# Patient Record
Sex: MALE | Race: WHITE
[De-identification: names, ages, dates, MRNs, and addresses within clinical notes are randomized per-mention and may not be internally consistent; named-entity substitution may affect disease eponyms.]

---

## 2019-06-11 ENCOUNTER — HOSPITAL ENCOUNTER (EMERGENCY)
Dept: HOSPITAL 59 - ER | Age: 54
Discharge: HOME | End: 2019-06-11
Payer: MEDICAID

## 2019-06-11 DIAGNOSIS — F17.210: ICD-10-CM

## 2019-06-11 DIAGNOSIS — J20.9: Primary | ICD-10-CM

## 2019-06-11 LAB
ABSOLUTE NEUTROPHIL COUNT: 5.71
ALBUMIN SERPL-MCNC: 4.2 G/DL (ref 4–5)
ALBUMIN/GLOB SERPL: 1.1 {RATIO} (ref 1.1–1.8)
ALP SERPL-CCNC: 118 U/L (ref 40–129)
ALT SERPL-CCNC: 20 U/L (ref ?–41)
ANION GAP SERPL CALC-SCNC: 12 MMOL/L (ref 7–16)
AST SERPL-CCNC: 17 U/L (ref 10–50)
BASOPHILS NFR BLD: 0.4 % (ref 0–6)
BILIRUB SERPL-MCNC: 0.7 MG/DL (ref 0.2–1)
BUN SERPL-MCNC: 19 MG/DL (ref 6–20)
CO2 SERPL-SCNC: 24 MMOL/L (ref 22–29)
CREAT SERPL-MCNC: 1.1 MG/DL (ref 0.7–1.2)
EOSINOPHIL NFR BLD: 9.1 % (ref 0–6)
ERYTHROCYTE [DISTWIDTH] IN BLOOD BY AUTOMATED COUNT: 13.9 % (ref 11.5–14.5)
EST GLOMERULAR FILTRATION RATE: > 60 ML/MIN
GLOBULIN SER-MCNC: 4 GM/DL (ref 1.4–4.8)
GLUCOSE SERPL-MCNC: 137 MG/DL (ref 74–109)
GRANULOCYTES NFR BLD: 51.4 % (ref 47–80)
HCT VFR BLD CALC: 42.3 % (ref 42–52)
HGB BLD-MCNC: 14.7 GM/DL (ref 14–18)
LYMPHOCYTES NFR BLD AUTO: 32.3 % (ref 16–45)
MCH RBC QN AUTO: 30.4 PG (ref 27–33)
MCHC RBC AUTO-ENTMCNC: 34.8 G/DL (ref 32–36)
MCV RBC AUTO: 87.6 FL (ref 81–97)
MONOCYTES NFR BLD: 6.8 % (ref 0–9)
PLATELET # BLD: 200 K/UL (ref 130–400)
PMV BLD AUTO: 9.7 FL (ref 7.4–10.4)
PROT SERPL-MCNC: 8.2 G/DL (ref 6.6–8.7)
RBC # BLD AUTO: 4.83 M/UL (ref 4.4–5.7)
TSH SERPL-ACNC: 2.21 UIU/ML (ref 0.27–4.2)
WBC # BLD AUTO: 11.1 K/UL (ref 4.2–12.2)

## 2019-06-11 PROCEDURE — 93005 ELECTROCARDIOGRAM TRACING: CPT

## 2019-06-11 PROCEDURE — 84443 ASSAY THYROID STIM HORMONE: CPT

## 2019-06-11 PROCEDURE — 99283 EMERGENCY DEPT VISIT LOW MDM: CPT

## 2019-06-11 PROCEDURE — 85025 COMPLETE CBC W/AUTO DIFF WBC: CPT

## 2019-06-11 PROCEDURE — 80053 COMPREHEN METABOLIC PANEL: CPT

## 2019-06-11 PROCEDURE — 84484 ASSAY OF TROPONIN QUANT: CPT

## 2019-06-11 PROCEDURE — 93010 ELECTROCARDIOGRAM REPORT: CPT

## 2019-06-11 PROCEDURE — 71046 X-RAY EXAM CHEST 2 VIEWS: CPT

## 2019-06-11 NOTE — EMERGENCY DEPARTMENT RECORD
History of Present Illness





- General


Chief Complaint: Cough


Stated Complaint: COUGH,HEART PALP, SORE THROAT


Time Seen by Provider: 19 17:26


Source: Patient


Mode of Arrival: Ambulatory


Limitations: No limitations





- History of Present Illness


Initial Comments: 


The patient is here due to a one week hx of an intermittent cough with nasal 

congestion and a ST. He has had intermittent sputum production but denies any 

SOB, GAURAV, or fever. Additionally the patient did have a feeling that his heart 

was racing for an hour or two last evening. He had no CP, SOB, sweating or 

nausea with it and also no feeling of palpitations or racing today. 





MD Complaint: Cough, Nasal congestion, Sore throat


Onset/Timin


-: Week(s)





- Related Data


                                  Previous Rx's











 Medication  Instructions  Recorded


 


Lisinopril [Zestril] 20 mg PO DAILY #30 tab 16


 


Doxycycline Monohydrate [Mondoxyne 100 mg PO BID 7 Days #14 capsule 19





Nl]  











                                    Allergies











Allergy/AdvReac Type Severity Reaction Status Date / Time


 


No Known Drug Allergies Allergy   Verified 19 17:25














Travel Screening





- Travel/Exposure Within Last 30 Days


Have you traveled within the last 30 days?: No





Review of Systems


Constitutional: Reports: Malaise.  Denies: Chills, Fever


Eyes: Denies: Eye discharge


ENT: Reports: Congestion


Respiratory: Reports: Cough.  Denies: Dyspnea


Cardiovascular: Reports: Arrhythmia, Palpitations.  Denies: Chest pain


Endocrine: Reports: Fatigue


Gastrointestinal: Denies: Abdominal pain, Nausea


Genitourinary: Denies: Dysuria


Musculoskeletal: Denies: Arthralgia, Back pain


Skin: Denies: Bruising





Past Medical History





- SOCIAL HISTORY


Smoking Status: Current every day smoker


Alcohol Use: None


Drug Use: Occasional


Drug Use Detail:: Marijuana





- RESPIRATORY


Hx Respiratory Disorders: No





- CARDIOVASCULAR


Hx Cardio Disorders: Yes


Hx Hypertension: Yes





- NEURO


Hx Neuro Disorders: No





- GI


Hx GI Disorders: No





- 


Hx Genitourinary Disorders: No





- ENDOCRINE


Hx Endocrine Disorders: No





- MUSCULOSKELETAL


Hx Musculoskeletal Disorders: Yes


Hx Arthritis: Yes





- PSYCH


Hx Psych Problems: No





- HEMATOLOGY/ONCOLOGY


Hx Hematology/Oncology Disorders: No





Family Medical History


Any Significant Family History?: Yes


Hx Cancer: Father


Hx Heart Disease: Mother, Brother/Sister





Physical Exam





- General


General Appearance: Alert, Oriented x3, Cooperative, No acute distress





- Head


Head exam: Atraumatic, Normocephalic, Normal inspection





- Eye


Eye exam: Normal appearance, PERRL, EOMI





- ENT


Throat exam: Normal inspection.  negative: Tonsillar erythema, Tonsillar exudate





- Neck


Neck exam: Normal inspection, Full ROM.  negative: Tenderness





- Respiratory


Respiratory exam: Normal lung sounds bilaterally.  negative: Respiratory 

distress





- Cardiovascular


Cardiovascular Exam: Regular rate, Normal rhythm, Normal heart sounds





- GI/Abdominal


GI/Abdominal exam: Soft, Normal bowel sounds.  negative: Tenderness





- Extremities


Extremities exam: Normal inspection, Full ROM, Normal capillary refill.  negati

ve: Tenderness





- Back


Back exam: Reports: Normal inspection





- Neurological


Neurological exam: Alert, Normal gait.  negative: Abnormal gait, Motor sensory 

deficit





- Psychiatric


Psychiatric exam: negative: Anxious





- Skin


Skin exam: negative: Rash





Course





                                   Vital Signs











  19





  17:23


 


Temperature 97.9 F


 


Pulse Rate 60


 


Respiratory 20





Rate 


 


Blood Pressure 188/87


 


Pulse Ox 96














- Reevaluation(s)


Reevaluation #1: 


The patient is doing well at this time. I did discuss the normal CXR and lab 

work with the patient. We will place him on Doxycycline and have him F/U with 

his PCP next week for recheck.


19 18:30








Medical Decision Making





- Data Complexity


MDM Data: Labs Ordered and/or Reviewed, X-Ray Ordered and/or Reviewed, EKG 

Ordered and/or Reviewed





- Lab Data


Result diagrams: 


                                 19 17:50





                                 19 17:50





- EKG Data


-: EKG Interpreted by Me


EKG: No Acute Changes, Unchanged From Previous





- Radiology Data


Radiology results: Report reviewed (CXR: Neg.)





Disposition


Disposition: Discharge


Clinical Impression: 


 Bronchitis





Disposition: Home, Self-Care


Condition: (2) Stable


Instructions:  Acute Bronchitis (ED)


Additional Instructions: 


Please continue your regular medicines and please use an OTC cough medicine as 

needed. Please take the Doxycycline as directed and please see your family 

doctor next week for recheck.


Prescriptions: 


Doxycycline Monohydrate [Mondoxyne Nl] 100 mg PO BID 7 Days #14 capsule


Forms:  Patient Portal Access


Time of Disposition: 18:32





Quality





- Quality Measures


Quality Measures: N/A





- Blood Pressure Screening


View Details: Yes


Does Patient Have Any of the Following: No


Blood Pressure Classification: Pre-Hypertensive BP Reading


Systolic Measurement: 188


Diastolic Measurement: 87


Screening for High Blood Pressure: < Pre-Hypertensive BP, F/U Documented > 

[]


Pre-Hypertensive Follow-up Interventions: Referral to alternative/primary care 

provider.

## 2019-06-12 ENCOUNTER — HOSPITAL ENCOUNTER (EMERGENCY)
Dept: HOSPITAL 59 - ER | Age: 54
Discharge: HOME | End: 2019-06-12
Payer: MEDICAID

## 2019-06-12 DIAGNOSIS — J20.9: ICD-10-CM

## 2019-06-12 DIAGNOSIS — J44.9: Primary | ICD-10-CM

## 2019-06-12 DIAGNOSIS — I10: ICD-10-CM

## 2019-06-12 DIAGNOSIS — F17.210: ICD-10-CM

## 2019-06-12 PROCEDURE — 96372 THER/PROPH/DIAG INJ SC/IM: CPT

## 2019-06-12 PROCEDURE — 99284 EMERGENCY DEPT VISIT MOD MDM: CPT

## 2019-06-12 NOTE — EMERGENCY DEPARTMENT RECORD
History of Present Illness





- General


Chief Complaint: Difficulty Breathing


Stated Complaint: GAURAV


Time Seen by Provider: 19 18:43


Source: Patient


Mode of Arrival: Ambulatory


Limitations: No limitations





- History of Present Illness


Initial Comments: 





pt here because he is having sob .  he was here last night for the same thing 

and was rxd doxycycline and had a neg cxr and neg labs.  he states he has been 

wheezing today and feels sob


MD Complaint: Shortness of breath


Onset/Timin


-: Week(s)


Improves With: Rest


Worsens With: Exertion


Associated Symptoms: Cough


Treatments Prior to Arrival: None





- Related Data


Home Oxygen Therapy: No


                                  Previous Rx's











 Medication  Instructions  Recorded


 


Lisinopril [Zestril] 20 mg PO DAILY #30 tab 16


 


Doxycycline Monohydrate [Mondoxyne 100 mg PO BID 7 Days #14 capsule 19





Nl]  


 


Albuterol Sulfate [Ventolin Hfa] 1 - 2 puff IH .EVERY 4-6 HOURS PRN 19





 #1 inhaler 


 


Prednisone [Prednisone 20Mg] 20 mg PO DAILY #3 tab 19











                                    Allergies











Allergy/AdvReac Type Severity Reaction Status Date / Time


 


No Known Drug Allergies Allergy   Verified 19 18:26














Travel Screening





- Travel/Exposure Within Last 30 Days


Have you traveled within the last 30 days?: No





- Travel/Exposure Within Last Year


Have you traveled outside the [place] in the last year?: No





- Additonal Travel Details


Have you been exposed to anyone with a communicable illness?: No





- Travel Symptoms


Symptom Screening: None





Review of Systems


Reviewed: No additional complaints except as noted below


Constitutional: Reports: As per HPI.  Denies: Chills, Fever, Malaise, Night 

sweats, Weakness, Weight change


Eyes: Reports: As per HPI.  Denies: Eye discharge, Eye pain, Photophobia, Vision

change


ENT: Reports: As per HPI.  Denies: Congestion, Dental pain, Ear pain, Epistaxis,

Hearing loss, Throat pain


Respiratory: Reports: As per HPI, Cough, Dyspnea, Wheezes.  Denies: Hemoptysis, 

Stridor


Cardiovascular: Reports: As per HPI.  Denies: Arrhythmia, Chest pain, Dyspnea on

exertion, Edema, Murmurs, Orthopnea, Palpitations, Paroxysmal nocturnal dyspnea,

Rheumatic Fever, Syncope


Endocrine: Reports: As per HPI.  Denies: Fatigue, Heat or cold intolerance, 

Polydipsia, Polyuria


Gastrointestinal: Reports: As per HPI.  Denies: Abdominal pain, Constipation, 

Diarrhea, Hematemesis, Hematochezia, Melena, Nausea, Vomiting


Genitourinary: Reports: As per HPI.  Denies: Dysuria, Frequency, Hematuria, 

Incontinence, Retention, Testicular pain, Testicular mass, Urgency


Musculoskeletal: Reports: As per HPI.  Denies: Arthralgia, Back pain, Gout, 

Joint swelling, Myalgia, Neck pain


Skin: Reports: As per HPI.  Denies: Bruising, Change in color, Change in 

hair/nails, Lesions, Pruritus, Rash


Neurological: Reports: As per HPI.  Denies: Abnormal gait, Confusion, Headache, 

Numbness, Paresthesias, Seizure, Tingling, Tremors, Vertigo, Weakness


Psychiatric: Reports: As per HPI.  Denies: Anxiety, Auditory hallucinations, 

Depression, Homicidal thoughts, Suicidal thoughts, Visual hallucinations


Hematological/Lymphatic: Reports: As per HPI.  Denies: Anemia, Blood Clots, Easy

bleeding, Easy bruising, Swollen glands





Past Medical History





- SOCIAL HISTORY


Smoking Status: Current every day smoker


Alcohol Use: None


Drug Use: Occasional


Drug Use Detail:: Marijuana





- RESPIRATORY


Hx Respiratory Disorders: No





- CARDIOVASCULAR


Hx Cardio Disorders: Yes


Hx Hypertension: Yes





- NEURO


Hx Neuro Disorders: No





- GI


Hx GI Disorders: No





- 


Hx Genitourinary Disorders: No





- ENDOCRINE


Hx Endocrine Disorders: No





- MUSCULOSKELETAL


Hx Musculoskeletal Disorders: Yes


Hx Arthritis: Yes





- PSYCH


Hx Psych Problems: No





- HEMATOLOGY/ONCOLOGY


Hx Hematology/Oncology Disorders: No





Family Medical History


Any Significant Family History?: Yes


Hx Cancer: Father


Hx Heart Disease: Mother, Brother/Sister





Physical Exam





- General


General Appearance: Alert, Oriented x3, Cooperative, Mild distress





- Head


Head exam: Normal inspection





- Eye


Eye exam: Normal appearance, PERRL, EOMI


Pupils: Normal accommodation





- ENT


ENT exam: Normal exam, Mucous membranes moist, Normal external ear exam, Normal 

orophraynx


Ear exam: Normal external inspection.  negative: External canal tenderness


Nasal Exam: Normal inspection.  negative: Discharge, Sinus tenderness


Mouth exam: Normal external inspection, Tongue normal


Teeth exam: Normal inspection.  negative: Dental caries


Throat exam: Normal inspection.  negative: Tonsillar erythema, Tonsillar exudate





- Neck


Neck exam: Normal inspection, Full ROM.  negative: Tenderness





- Respiratory


Respiratory exam: Decreased breath sounds, Wheezes.  negative: Respiratory 

distress





- Cardiovascular


Cardiovascular Exam: Regular rate, Normal rhythm, Normal heart sounds





- GI/Abdominal


GI/Abdominal exam: Soft, Normal bowel sounds.  negative: Tenderness





- Rectal


Rectal exam: Deferred





- 


 exam: Deferred





- Extremities


Extremities exam: Normal inspection, Full ROM, Normal capillary refill.  

negative: Tenderness





- Back


Back exam: Reports: Normal inspection, Full ROM.  Denies: Muscle spasm, Rash 

noted, Tenderness





- Neurological


Neurological exam: Alert, CN II-XII intact, Normal gait, Oriented X3





- Psychiatric


Psychiatric exam: Normal affect, Normal mood





- Skin


Skin exam: Dry, Intact, Normal color, Warm





Course





                                   Vital Signs











  19





  18:27


 


Temperature 97.7 F


 


Pulse Rate 67


 


Respiratory 20





Rate 


 


Blood Pressure 159/78


 


Pulse Ox 95














Disposition


Disposition: Discharge


Clinical Impression: 


 COPD (chronic obstructive pulmonary disease) with acute bronchitis





Disposition: Home, Self-Care


Condition: (1) Good


Instructions:  COPD (Chronic Obstructive Pulmonary Disease) (ED), Acute 

Bronchitis (ED)


Additional Instructions: 


follow up with family doctor.  return sooner if worse. continue current meds


Prescriptions: 


Prednisone [Prednisone 20Mg] 20 mg PO DAILY #3 tab


Albuterol Sulfate [Ventolin Hfa] 1 - 2 puff IH .EVERY 4-6 HOURS PRN #1 inhaler


 PRN Reason: Difficulty In Breathing


Forms:  Patient Portal Access





Quality





- Quality Measures


Quality Measures: N/A





- Blood Pressure Screening


Does Patient Have Any of the Following: Active Dx of HTN


Blood Pressure Classification: Hypertensive Reading


Systolic Measurement: 159


Diastolic Measurement: 78


Screening for High Blood Pressure: Patient Exclusion, Hx of HTN []

## 2019-06-13 NOTE — RADIOLOGY REPORT
EXAM:  CHEST 2 VIEWS



HISTORY:  COUGH AND CONGESTION FOR THE PAST WEEK. SMOKER. 



TECHNIQUE:  PA and lateral upright views of the chest were obtained. 



COMPARISON:  March 1, 2016. 



FINDINGS:  The heart, mediastinum, and pulmonary vasculature are normal. The 
lungs are clear. There is no visible infiltrate or effusion. There is no 
pneumothorax. The bones appear intact. 



IMPRESSION:  NO ACUTE CHEST PATHOLOGY. 



JOB NUMBER: [id number]

MTDD